# Patient Record
Sex: MALE | Race: WHITE | Employment: UNEMPLOYED | URBAN - METROPOLITAN AREA
[De-identification: names, ages, dates, MRNs, and addresses within clinical notes are randomized per-mention and may not be internally consistent; named-entity substitution may affect disease eponyms.]

---

## 2020-02-18 ENCOUNTER — OFFICE VISIT (OUTPATIENT)
Dept: FAMILY MEDICINE CLINIC | Age: 20
End: 2020-02-18
Payer: COMMERCIAL

## 2020-02-18 VITALS
TEMPERATURE: 97.9 F | OXYGEN SATURATION: 99 % | RESPIRATION RATE: 18 BRPM | WEIGHT: 129 LBS | HEART RATE: 61 BPM | HEIGHT: 69 IN | SYSTOLIC BLOOD PRESSURE: 108 MMHG | DIASTOLIC BLOOD PRESSURE: 64 MMHG | BODY MASS INDEX: 19.11 KG/M2

## 2020-02-18 LAB
ALBUMIN SERPL-MCNC: 3.9 G/DL (ref 3.5–4.6)
ALP BLD-CCNC: 93 U/L (ref 35–104)
ALT SERPL-CCNC: 69 U/L (ref 0–41)
ANION GAP SERPL CALCULATED.3IONS-SCNC: 11 MEQ/L (ref 9–15)
AST SERPL-CCNC: 44 U/L (ref 0–40)
BILIRUB SERPL-MCNC: <0.2 MG/DL (ref 0.2–0.7)
BUN BLDV-MCNC: 10 MG/DL (ref 6–20)
CALCIUM SERPL-MCNC: 8.4 MG/DL (ref 8.5–9.9)
CHLORIDE BLD-SCNC: 103 MEQ/L (ref 95–107)
CHOLESTEROL, FASTING: 108 MG/DL (ref 0–199)
CO2: 25 MEQ/L (ref 20–31)
CREAT SERPL-MCNC: 0.77 MG/DL (ref 0.7–1.2)
GFR AFRICAN AMERICAN: >60
GFR NON-AFRICAN AMERICAN: >60
GLOBULIN: 3.4 G/DL (ref 2.3–3.5)
GLUCOSE BLD-MCNC: 93 MG/DL (ref 70–99)
HCT VFR BLD CALC: 34.8 % (ref 42–52)
HDLC SERPL-MCNC: 24 MG/DL (ref 40–59)
HEMOGLOBIN: 10.8 G/DL (ref 14–18)
LDL CHOLESTEROL CALCULATED: 72 MG/DL (ref 0–129)
MCH RBC QN AUTO: 22.4 PG (ref 27–31.3)
MCHC RBC AUTO-ENTMCNC: 30.9 % (ref 33–37)
MCV RBC AUTO: 72.3 FL (ref 80–100)
PDW BLD-RTO: 16.6 % (ref 11.5–14.5)
PLATELET # BLD: 315 K/UL (ref 130–400)
POTASSIUM SERPL-SCNC: 4.2 MEQ/L (ref 3.4–4.9)
RBC # BLD: 4.81 M/UL (ref 4.7–6.1)
SODIUM BLD-SCNC: 139 MEQ/L (ref 135–144)
TOTAL PROTEIN: 7.3 G/DL (ref 6.3–8)
TRIGLYCERIDE, FASTING: 61 MG/DL (ref 0–150)
TSH REFLEX: 3.02 UIU/ML (ref 0.44–3.86)
WBC # BLD: 4.8 K/UL (ref 4.5–11)

## 2020-02-18 PROCEDURE — G8420 CALC BMI NORM PARAMETERS: HCPCS | Performed by: PHYSICIAN ASSISTANT

## 2020-02-18 PROCEDURE — G8482 FLU IMMUNIZE ORDER/ADMIN: HCPCS | Performed by: PHYSICIAN ASSISTANT

## 2020-02-18 PROCEDURE — 90688 IIV4 VACCINE SPLT 0.5 ML IM: CPT | Performed by: PHYSICIAN ASSISTANT

## 2020-02-18 PROCEDURE — 99203 OFFICE O/P NEW LOW 30 MIN: CPT | Performed by: PHYSICIAN ASSISTANT

## 2020-02-18 PROCEDURE — 1036F TOBACCO NON-USER: CPT | Performed by: PHYSICIAN ASSISTANT

## 2020-02-18 PROCEDURE — 90471 IMMUNIZATION ADMIN: CPT | Performed by: PHYSICIAN ASSISTANT

## 2020-02-18 PROCEDURE — G8427 DOCREV CUR MEDS BY ELIG CLIN: HCPCS | Performed by: PHYSICIAN ASSISTANT

## 2020-02-18 SDOH — HEALTH STABILITY: MENTAL HEALTH: HOW OFTEN DO YOU HAVE A DRINK CONTAINING ALCOHOL?: NEVER

## 2020-02-18 ASSESSMENT — PATIENT HEALTH QUESTIONNAIRE - PHQ9
SUM OF ALL RESPONSES TO PHQ9 QUESTIONS 1 & 2: 0
SUM OF ALL RESPONSES TO PHQ QUESTIONS 1-9: 0
SUM OF ALL RESPONSES TO PHQ QUESTIONS 1-9: 0
1. LITTLE INTEREST OR PLEASURE IN DOING THINGS: 0
2. FEELING DOWN, DEPRESSED OR HOPELESS: 0

## 2020-02-18 ASSESSMENT — ENCOUNTER SYMPTOMS
RESPIRATORY NEGATIVE: 1
GASTROINTESTINAL NEGATIVE: 1
ALLERGIC/IMMUNOLOGIC NEGATIVE: 1
EYES NEGATIVE: 1

## 2020-02-18 NOTE — PROGRESS NOTES
Patient can eat eggs  Is not currently sick  Has tolerated in the past   No hx of Guillain-Barré syndrome (GBS)

## 2020-03-04 ENCOUNTER — HOSPITAL ENCOUNTER (OUTPATIENT)
Dept: PHYSICAL THERAPY | Age: 20
Setting detail: THERAPIES SERIES
Discharge: HOME | End: 2020-03-04
Payer: COMMERCIAL

## 2020-03-04 PROCEDURE — 97161 PT EVAL LOW COMPLEX 20 MIN: CPT

## 2020-03-04 PROCEDURE — 97110 THERAPEUTIC EXERCISES: CPT

## 2020-03-04 ASSESSMENT — PAIN SCALES - GENERAL: PAINLEVEL_OUTOF10: 5

## 2020-03-04 ASSESSMENT — PAIN DESCRIPTION - PROGRESSION: CLINICAL_PROGRESSION: GRADUALLY WORSENING

## 2020-03-04 ASSESSMENT — PAIN DESCRIPTION - FREQUENCY: FREQUENCY: CONTINUOUS

## 2020-03-04 ASSESSMENT — PAIN DESCRIPTION - ONSET: ONSET: PROGRESSIVE

## 2020-03-04 ASSESSMENT — PAIN DESCRIPTION - ORIENTATION: ORIENTATION: RIGHT;LEFT;ANTERIOR;POSTERIOR

## 2020-03-04 ASSESSMENT — PAIN DESCRIPTION - LOCATION: LOCATION: KNEE

## 2020-03-04 ASSESSMENT — PAIN - FUNCTIONAL ASSESSMENT: PAIN_FUNCTIONAL_ASSESSMENT: PREVENTS OR INTERFERES WITH MANY ACTIVE NOT PASSIVE ACTIVITIES

## 2020-03-04 ASSESSMENT — PAIN DESCRIPTION - DESCRIPTORS: DESCRIPTORS: CONSTANT;THROBBING;STABBING

## 2020-03-04 ASSESSMENT — PAIN DESCRIPTION - PAIN TYPE: TYPE: CHRONIC PAIN

## 2020-03-04 NOTE — PROGRESS NOTES
which were (-). Pt reports R knee is worse than L knee with increased \"grinding and poppping. \" Denies N/T or radicular pain, however does have some pain throughout distal LE to B ankles. Has intermittent feelings of knees wanting to \"buckle\" however denies any falls.    Comments: RTD after PT     Objective:   Balance  Single Leg Stance R Le  Single Leg Stance L Le  Comments: min frontal plane mvmt with R knee pain and feeling like it was \"going to buckle\"    Ambulation 1  Surface: carpet  Device: No Device  Assistance: Independent  Quality of Gait: R toe out, increased time spent on toes, B medial whip slight L overpronation  Distance: within dept clinical distances     Strength RLE  Comment: 4/5 hip flex, abd, knee ext, hip ER 4-/5 Hip IR, ankle DF, knee flex, hip ext, add   Strength LLE  Comment: 4-/5 knee, Hip IR, ER, add 4/5 hip flex, ext, abd, ankle DF    PROM RLE (degrees)  RLE General PROM: 90/90 HS 55  AROM RLE (degrees)  RLE General AROM: knee in supine 0-130 with pain at end range; ankle DF with KE 4 with KF 16  PROM LLE (degrees)  LLE General PROM: 90/90 HS 48   AROM LLE (degrees)  LLE General AROM: knee in supine 2-0-128 with pain at end range; ankle DF with KE 10 with KF 12     Observation/Palpation  Posture: Fair  Observation: B pes planus, R toe out, B patella verónica      Joint Mobility  ROM RLE: normal patellar mobility with mild decreased lateral translation and increased crepitus noted  ROM LLE: patellar hypomobility sup/inf, med/lat    Additional Measures  Special Tests: (-) varus/valgus stress, ant/post drawer   Other: able to perform mid squat with increased frontal plane mvmt, quad dominant, and early heel rise, R toe out     Exercises:   Exercises  Exercise 1: B HS stretch 30\"x3   Exercise 2: B gastroc/soleus stretch 30\"x3   Exercise 3: 4 way SLR*  Exercise 4: clams*  Exercise 5: bridges*   Exercise 6: standing gastroc/soleus stretches*  Exercise 7: pball hip ext on wall*   Exercise 8: SLS*  Exercise 9: step ups*  Exercise 10: SLS with reach*   Exercise 20: HEP: HS stretch, gastroc/soleus stretches   Modalities:  Modalities  Cryotherapy (Minutes\Location): declined this date   *Indicates exercise,modality, or manual techniques to be initiated when appropriate    Assessment: Body structures, Functions, Activity limitations: Decreased functional mobility , Decreased ROM, Decreased ADL status, Decreased strength, Decreased balance, Increased pain, Decreased posture  Assessment: Pt presents with a chronic hx of B knee pain with decreased B HS, gastroc/soleus flexibility, decreased B LE strength, decreased stability with B SLS, decreased fucntional activity tolerance, L patellar hypomobility, and crepitus with R patellar mobility. These impairments currently limit his fucntional abilities to perform ADL's, stand/ambulate prolonged periods, run, jump, perform stairs, or squat without increased pain or limitations.    Prognosis: Good  Discharge Recommendations: Continue to assess pending progress  Activity Tolerance: Patient Tolerated treatment well  Activity Tolerance: pt with fair vita to eval and initiation of HEP      Decision Making: Low Complexity  History: low  Exam: high; LEFS 53/80  Clinical Presentation: low      Plan  Frequency/Duration:  Plan  Times per week: 2  Plan weeks: 4-6  Current Treatment Recommendations: Strengthening, ROM, Gait Training, Balance Training, Neuromuscular Re-education, Manual Therapy - Soft Tissue Mobilization, Manual Therapy - Joint Manipulation, Pain Management, Home Exercise Program, Equipment Evaluation, Education, & procurement, Patient/Caregiver Education & Training, Modalities, Positioning     Patient Education  New Education Provided: PT Education: Goals;PT Role;Plan of Care;Home Exercise Program  Patient Education: evaluative findings, anatomy of current condition    POST-PAIN     Pain Rating (0-10 pain scale):   5/10 R knee 2/10 L knee  Location and pain description same as pre-treatment unless indicated. Action: [] NA  [] Call Physician  [x] Perform HEP  [] Meds as prescribed    Evaluation and patient rights have been reviewed and patient agrees with plan of care. Yes  [x]  No  []   Explain:     [place] Fall Risk Assessment  Risk Factor Scale  Score   History of Falls [] Yes  [x] No 25  0    Secondary Diagnosis [] Yes  [x] No 15  0    Ambulatory Aid [] Furniture  [] Crutches/cane/walker  [x] None/bedrest/wheelchair/nurse 30  15  0    IV/Heparin Lock [] Yes  [x] No 20  0    Gait/Transferring [] Impaired  [] Weak  [x] Normal/bedrest/immobile 20  10  0    Mental Status [] Forgets limitations  [x] Oriented to own ability 15  0       Total: 0     Based on the Assessment score: check the appropriate box.   [x]  No intervention needed   Low =   Score of 0-24  []  Use standard prevention interventions Moderate =  Score of 24-44   [] Discuss fall prevention strategies   [] Indicate moderate falls risk on eval  []  Use high risk prevention interventions High = Score of 45 and higher   [] Discuss fall prevention strategies   [] Provide supervision during treatment time    Goals  Short term goals  Time Frame for Short term goals: 2-3 weeks   Short term goal 1: The pt will report decreased B knee pain </=3/10 at worst in order to perform functional ADL's  Long term goals  Time Frame for Long term goals : 4-6 weeks   Long term goal 1: The pt will have a increase in LEFS score >/=10 points in order to increase functional activity tolerance   Long term goal 2: The pt will be indep/compliant with HEP in order to self manage symptoms upon D/C  Long term goal 3: The pt will demo improved B LE flexibility with improved B ankle DF AROM with KE with>/=15* with KF >/=20* and B HS PROM with 90/90 </=30* in order to increase LE biomechanics and decrease knee pain  Long term goal 4: The pt will demo improved B LE strength >/= 4+/5 in order to ambulate prolonged periods with decreased pain/limitations   Long term goal 5:  The pt will demo improved B SLS >/=30 seconds without frontal plane instability to increase B Le stability and decrease episodes of \"buckling\"    Treatment Initiated : ther ex and hep     PT Individual Minutes  Time In: 3789  Time Out: 8514  Minutes: 45  Timed Code Treatment Minutes: 12 Minutes  Procedure Minutes: 33 -eval     Timed Activity Minutes Units   Ther Ex 12 1     Electronically signed by Zen Charles PT on 3/4/20 at 3:18 PM

## 2020-03-04 NOTE — PROGRESS NOTES
improved B LE strength >/= 4+/5 in order to ambulate prolonged periods with decreased pain/limitations  Strength RLE  Comment: 4/5 hip flex, abd, knee ext, hip ER 4-/5 Hip IR, ankle DF, knee flex, hip ext, add   Strength LLE  Comment: 4-/5 knee, Hip IR, ER, add 4/5 hip flex, ext, abd, ankle DF   [] yes  [x] no   Long term goal 5: The pt will demo improved B SLS >/=30 seconds without frontal plane instability to increase B Le stability and decrease episodes of \"buckling\" L SLS 30\"   R SLS 17\"  min frontal plane mvmt B with R knee pain and feeling like it was \"going to buckle\" [] yes  [x] no      Body structures, Functions, Activity limitations: Decreased functional mobility , Decreased ROM, Decreased ADL status, Decreased strength, Decreased balance, Increased pain, Decreased posture  Assessment: Pt presents with a chronic hx of B knee pain with decreased B HS, gastroc/soleus flexibility, decreased B LE strength, decreased stability with B SLS, decreased fucntional activity tolerance, L patellar hypomobility, and crepitus with R patellar mobility. These impairments currently limit his fucntional abilities to perform ADL's, stand/ambulate prolonged periods, run, jump, perform stairs, or squat without increased pain or limitations.    Prognosis: Good  Discharge Recommendations: Continue to assess pending progress    PT Education: Goals;PT Role;Plan of Care;Home Exercise Program  Patient Education: evaluative findings, anatomy of current condition    PLAN: [x] Evaluate and Treat  Frequency/Duration:  Plan  Times per week: 2  Plan weeks: 4-6  Current Treatment Recommendations: Strengthening, ROM, Gait Training, Balance Training, Neuromuscular Re-education, Manual Therapy - Soft Tissue Mobilization, Manual Therapy - Joint Manipulation, Pain Management, Home Exercise Program, Equipment Evaluation, Education, & procurement, Patient/Caregiver Education & Training, Modalities, Positioning                  Patient Status:[x] Continue/ Initiate plan of Care    [] Discharge PT. Recommend pt continue with HEP. [] Additional visits requested, Please re-certify for additional visits:        Signature: Electronically signed by Anibal Washburn PT on 3/4/20 at 3:20 PM    If you have any questions or concerns, please don't hesitate to call. Thank you for your referral.    I have reviewed this plan of care and certify a need for medically necessary rehabilitation services.     Physician Signature:__________________________________________________________  Date:  Please sign and return

## 2020-03-09 ENCOUNTER — HOSPITAL ENCOUNTER (OUTPATIENT)
Dept: PHYSICAL THERAPY | Age: 20
Setting detail: THERAPIES SERIES
Discharge: HOME | End: 2020-03-09
Payer: COMMERCIAL

## 2020-03-09 PROCEDURE — 97110 THERAPEUTIC EXERCISES: CPT

## 2020-03-09 ASSESSMENT — PAIN DESCRIPTION - LOCATION: LOCATION: KNEE

## 2020-03-09 ASSESSMENT — PAIN DESCRIPTION - DESCRIPTORS: DESCRIPTORS: CONSTANT;ACHING;STABBING

## 2020-03-09 ASSESSMENT — PAIN DESCRIPTION - PAIN TYPE: TYPE: CHRONIC PAIN

## 2020-03-09 ASSESSMENT — PAIN DESCRIPTION - ORIENTATION: ORIENTATION: RIGHT;LEFT;ANTERIOR;POSTERIOR

## 2020-03-09 ASSESSMENT — PAIN SCALES - GENERAL: PAINLEVEL_OUTOF10: 5

## 2020-03-09 ASSESSMENT — PAIN DESCRIPTION - FREQUENCY: FREQUENCY: CONTINUOUS

## 2020-03-09 NOTE — PROGRESS NOTES
[id number] [address]  Outpatient Physical Therapy    Treatment Note        Date: 3/9/2020  Patient: Franky Terry  : 2000  ACCT #: [de-identified]  Referring Practitioner: Lilliana Eubanks PA-C  Diagnosis: Chronic pain of B knees     Visit Information:  PT Visit Information  Onset Date: 20  [facility]  Total # of Visits to Date: 2  No Show: 0  Canceled Appointment: 0  Progress Note Counter: -    Subjective: Reports B knee's are still sore. Completing HEP  Comments: RTD after PT   HEP Compliance:  [x] Good [] Fair [] Poor [] Reports not doing due to:    Vital Signs  Patient Currently in Pain: Yes   Pain Screening  Patient Currently in Pain: Yes  Pain Assessment  Pain Assessment: 0-10  Pain Level: 5(L knee 3/10)  Pain Type: Chronic pain  Pain Location: Knee  Pain Orientation: Right;Left; Anterior;Posterior  Pain Descriptors: Constant; Aching;Stabbing  Pain Frequency: Continuous    OBJECTIVE:   Exercises  Exercise 1: B HS stretch 30\"x3   Exercise 2: B gastroc/soleus stretch 30\"x3   Exercise 3: 4 way SLR X 10 each  Exercise 4: clams X 10  Exercise 5: bridges X 10 5 second hold  Exercise 6: standing gastroc/soleus stretches X 3 30 seconds COMPA  Exercise 7: pball hip ext on wall*   Exercise 8: SLS x 3 10 seconds COMPA (increase time NV)  Exercise 9: step ups*  Exercise 10: SLS with reach*   Exercise 20: HEP: HS stretch, gastroc/soleus stretches; clams, standing gastroc/soleus stretch; bridges    Assessment: Body structures, Functions, Activity limitations: Decreased functional mobility , Decreased ROM, Decreased ADL status, Decreased strength, Decreased balance, Increased pain, Decreased posture  Assessment: Progressed TE for flexibility to COMPA LE and ankles for improved biomechanics and decrease pain. Pt reports increased B knee/quad discomfort with 4 way SLR's. Performs standing gastroc/soleus stretch with verbal cues for technique and stretch intensity.   Requires 1 UE support during R SLS for stability and safety. Increased R knee pain to 6/10 and L knee decreased to 2/10 after treatment. Treatment Diagnosis: B knee pain with decreased B HS, gastroc/soleus flexibility, decreased B LE strength, decreased stability with B SLS, decreased fucntional activity tolerance, L patellar hypomobility, and crepitus with R patellar mobility  Prognosis: Good  Patient Education: Pt educated on TE progression for flexibility and strength. States he understands and performs with verbal cues. Goals:  Short term goals  Time Frame for Short term goals: 2-3 weeks   Short term goal 1: The pt will report decreased B knee pain </=3/10 at worst in order to perform functional ADL's    Long term goals  Time Frame for Long term goals : 4-6 weeks   Long term goal 1: The pt will have a increase in LEFS score >/=10 points in order to increase functional activity tolerance   Long term goal 2: The pt will be indep/compliant with HEP in order to self manage symptoms upon D/C  Long term goal 3: The pt will demo improved B LE flexibility with improved B ankle DF AROM with KE with>/=15* with KF >/=20* and B HS PROM with 90/90 </=30* in order to increase LE biomechanics and decrease knee pain  Long term goal 4: The pt will demo improved B LE strength >/= 4+/5 in order to ambulate prolonged periods with decreased pain/limitations   Long term goal 5: The pt will demo improved B SLS >/=30 seconds without frontal plane instability to increase B Le stability and decrease episodes of \"buckling\"  Progress toward goals: TE/HEP progression for B LE strengthening and flexibility and decrease pain. POST-PAIN       Pain Rating (0-10 pain scale):   6/10 R knee; L knee 2/10  Location and pain description same as pre-treatment unless indicated.    Action: [] NA   [x] Perform HEP  [] Meds as prescribed  [] Modalities as prescribed   [] Call Physician     Frequency/Duration:  Plan  Times per week: 2  Plan weeks: 4-6  Current Treatment Recommendations: Strengthening, ROM, Gait Training, Balance Training, Neuromuscular Re-education, Manual Therapy - Soft Tissue Mobilization, Manual Therapy - Joint Manipulation, Pain Management, Home Exercise Program, Equipment Evaluation, Education, & procurement, Patient/Caregiver Education & Training, Modalities, Positioning     Pt to continue current HEP. See objective section for any therapeutic exercise changes, additions or modifications this date.     PT Individual Minutes  Time In: 0586  Time Out: 9811  Minutes: 39  Timed Code Treatment Minutes: 39 Minutes  Procedure Minutes: 0     Timed Activity Minutes Units   Ther Ex 39 3       Signature:  Electronically signed by Raman Mukherjee PTA on 3/9/20 at 3:47 PM EDT

## 2020-03-11 ENCOUNTER — HOSPITAL ENCOUNTER (OUTPATIENT)
Dept: PHYSICAL THERAPY | Age: 20
Setting detail: THERAPIES SERIES
Discharge: HOME | End: 2020-03-11
Payer: COMMERCIAL

## 2020-03-11 PROCEDURE — 97110 THERAPEUTIC EXERCISES: CPT

## 2020-03-11 ASSESSMENT — PAIN DESCRIPTION - LOCATION: LOCATION: KNEE

## 2020-03-11 ASSESSMENT — PAIN SCALES - GENERAL: PAINLEVEL_OUTOF10: 5

## 2020-03-11 ASSESSMENT — PAIN DESCRIPTION - ORIENTATION: ORIENTATION: RIGHT;LEFT

## 2020-03-11 ASSESSMENT — PAIN DESCRIPTION - DESCRIPTORS: DESCRIPTORS: ACHING;SHARP

## 2020-03-11 NOTE — PROGRESS NOTES
[address] [address]  Outpatient Physical Therapy    Treatment Note        Date: 3/11/2020  Patient: Shiela Huber  : 2000  ACCT #: [de-identified]  Referring Practitioner: Sami Turner PA-C  Diagnosis: Chronic pain of B knees     Visit Information:  PT Visit Information  Onset Date: 20  PT Insurance Information: [employer]  Total # of Visits to Date: 3  No Show: 0  Canceled Appointment: 0  Progress Note Counter: 3/8-    Subjective: Pt reports pain greater in Rt knee than Lt. States some discomfort with completing gastroc stretch at home. Comments: RTD after PT   HEP Compliance:  [x] Good [] Fair [] Poor [] Reports not doing due to:    Vital Signs  Patient Currently in Pain: Yes   Pain Screening  Patient Currently in Pain: Yes  Pain Assessment  Pain Assessment: 0-10  Pain Level: 5  Pain Location: Knee  Pain Orientation: Right;Left  Pain Descriptors: Aching; Sharp    OBJECTIVE:   Exercises  Exercise 1: B HS stretch standing at step 30 sec x 3   Exercise 3: 4 way SLR x10 each b/l  Exercise 4: clams x10 b/l  Exercise 5: bridges 5 sec x 10  Exercise 6: Gastroc str at steps 30 sec x 3 b/l  Exercise 7: pball hip ext on wall 5 sec x 10 b/l  Exercise 8: SLS 30 sec x 3 b/l  Exercise 9: step ups 4\" F x10 ea b/l  Exercise 10: SLS with reach x10 b/l  Exercise 20: HEP: 4 way SLR    *Indicates exercise, modality, or manual techniques to be initiated when appropriate    Assessment: Body structures, Functions, Activity limitations: Decreased functional mobility , Decreased ROM, Decreased ADL status, Decreased strength, Decreased balance, Increased pain, Decreased posture  Assessment: VC/TC to decreased compensations with SL hip abd this date with fair carryover. Pt reporting fatigue with current reps. Increased SLS time, as well as added step ups and SLS reach to progress without report of increased pain.    Treatment Diagnosis: B knee pain with decreased B HS, gastroc/soleus flexibility, decreased B LE strength, decreased stability with B SLS, decreased fucntional activity tolerance, L patellar hypomobility, and crepitus with R patellar mobility  Prognosis: Good     Goals:  Short term goals  Time Frame for Short term goals: 2-3 weeks   Short term goal 1: The pt will report decreased B knee pain </=3/10 at worst in order to perform functional ADL's    Long term goals  Time Frame for Long term goals : 4-6 weeks   Long term goal 1: The pt will have a increase in LEFS score >/=10 points in order to increase functional activity tolerance   Long term goal 2: The pt will be indep/compliant with HEP in order to self manage symptoms upon D/C  Long term goal 3: The pt will demo improved B LE flexibility with improved B ankle DF AROM with KE with>/=15* with KF >/=20* and B HS PROM with 90/90 </=30* in order to increase LE biomechanics and decrease knee pain  Long term goal 4: The pt will demo improved B LE strength >/= 4+/5 in order to ambulate prolonged periods with decreased pain/limitations   Long term goal 5: The pt will demo improved B SLS >/=30 seconds without frontal plane instability to increase B Le stability and decrease episodes of \"buckling\"  Progress toward goals: Progressing towards all    POST-PAIN       Pain Rating (0-10 pain scale):   5/10 Rt knee, 2/10 Lt knee  Location and pain description same as pre-treatment unless indicated. Action: [] NA   [x] Perform HEP  [] Meds as prescribed  [] Modalities as prescribed   [] Call Physician     Frequency/Duration:  Plan  Times per week: 2  Plan weeks: 4-6  Current Treatment Recommendations: Strengthening, ROM, Gait Training, Balance Training, Neuromuscular Re-education, Manual Therapy - Soft Tissue Mobilization, Manual Therapy - Joint Manipulation, Pain Management, Home Exercise Program, Equipment Evaluation, Education, & procurement, Patient/Caregiver Education & Training, Modalities, Positioning     Pt to continue current HEP.   See objective section for any therapeutic exercise changes, additions or modifications this date.          PT Individual Minutes  Time In: 1520  Time Out: 1388  Minutes: 38  Timed Code Treatment Minutes: 38 Minutes  Procedure Minutes: 0     Timed Activity Minutes Units   Ther Ex 38 3       Signature:  Electronically signed by Taylor Nina PTA on 3/11/20 at 3:26 PM EDT

## 2020-03-17 ENCOUNTER — HOSPITAL ENCOUNTER (OUTPATIENT)
Dept: PHYSICAL THERAPY | Age: 20
Setting detail: THERAPIES SERIES
Discharge: HOME | End: 2020-03-17
Payer: COMMERCIAL

## 2020-03-17 ENCOUNTER — OFFICE VISIT (OUTPATIENT)
Dept: FAMILY MEDICINE CLINIC | Age: 20
End: 2020-03-17
Payer: COMMERCIAL

## 2020-03-17 VITALS
SYSTOLIC BLOOD PRESSURE: 106 MMHG | DIASTOLIC BLOOD PRESSURE: 66 MMHG | HEIGHT: 69 IN | TEMPERATURE: 98.3 F | OXYGEN SATURATION: 97 % | BODY MASS INDEX: 19.4 KG/M2 | WEIGHT: 131 LBS | HEART RATE: 102 BPM

## 2020-03-17 LAB — S PYO AG THROAT QL: NORMAL

## 2020-03-17 PROCEDURE — G8427 DOCREV CUR MEDS BY ELIG CLIN: HCPCS | Performed by: PHYSICIAN ASSISTANT

## 2020-03-17 PROCEDURE — 99213 OFFICE O/P EST LOW 20 MIN: CPT | Performed by: PHYSICIAN ASSISTANT

## 2020-03-17 PROCEDURE — 87880 STREP A ASSAY W/OPTIC: CPT | Performed by: PHYSICIAN ASSISTANT

## 2020-03-17 PROCEDURE — G8420 CALC BMI NORM PARAMETERS: HCPCS | Performed by: PHYSICIAN ASSISTANT

## 2020-03-17 PROCEDURE — 1036F TOBACCO NON-USER: CPT | Performed by: PHYSICIAN ASSISTANT

## 2020-03-17 PROCEDURE — G8482 FLU IMMUNIZE ORDER/ADMIN: HCPCS | Performed by: PHYSICIAN ASSISTANT

## 2020-03-17 RX ORDER — AMOXICILLIN AND CLAVULANATE POTASSIUM 875; 125 MG/1; MG/1
1 TABLET, FILM COATED ORAL 2 TIMES DAILY
Qty: 20 TABLET | Refills: 0 | Status: SHIPPED | OUTPATIENT
Start: 2020-03-17 | End: 2020-03-27

## 2020-03-17 RX ORDER — FLUTICASONE PROPIONATE 50 MCG
1 SPRAY, SUSPENSION (ML) NASAL DAILY
Qty: 1 BOTTLE | Refills: 0 | Status: SHIPPED | OUTPATIENT
Start: 2020-03-17

## 2020-03-17 RX ORDER — BENZONATATE 200 MG/1
200 CAPSULE ORAL 3 TIMES DAILY PRN
Qty: 30 CAPSULE | Refills: 0 | Status: SHIPPED | OUTPATIENT
Start: 2020-03-17 | End: 2020-03-24

## 2020-03-17 SDOH — ECONOMIC STABILITY: FOOD INSECURITY: WITHIN THE PAST 12 MONTHS, YOU WORRIED THAT YOUR FOOD WOULD RUN OUT BEFORE YOU GOT MONEY TO BUY MORE.: NEVER TRUE

## 2020-03-17 SDOH — ECONOMIC STABILITY: FOOD INSECURITY: WITHIN THE PAST 12 MONTHS, THE FOOD YOU BOUGHT JUST DIDN'T LAST AND YOU DIDN'T HAVE MONEY TO GET MORE.: NEVER TRUE

## 2020-03-17 ASSESSMENT — ENCOUNTER SYMPTOMS
EYES NEGATIVE: 1
SINUS PRESSURE: 1
DIARRHEA: 0
NAUSEA: 0
ALLERGIC/IMMUNOLOGIC NEGATIVE: 1
COUGH: 1
BACK PAIN: 0
CONSTIPATION: 0
SINUS PAIN: 1
SORE THROAT: 1
VOMITING: 0

## 2020-03-17 NOTE — PROGRESS NOTES
[address]       Physical Therapy  Cancellation/No-show Note  Patient Name:  Bryson Potter  :  2000   Date:  3/17/2020  Referring Practitioner: Francesco Becker PA-C  Diagnosis: Chronic pain of B knees     Visit Information:  PT Visit Information  Onset Date: 20  [facility]  Total # of Visits to Date: 3  No Show: 0  Canceled Appointment: 1  Progress Note Counter: 3/8-12 Called to cx, no reason given.     For today's appointment patient:  [x]  Cancelled  []  Rescheduled appointment  []  No-show   []  Called pt to remind of next appointment     Reason given by patient:  []  Patient ill  []  Conflicting appointment  []  No transportation    []  Conflict with work  [x]  No reason given  []  Other:       Comments:       Signature: Electronically signed by Stacy Kirkpatrick PTA on 3/17/20 at 1:20 PM EDT

## 2020-03-19 ENCOUNTER — HOSPITAL ENCOUNTER (OUTPATIENT)
Dept: PHYSICAL THERAPY | Age: 20
Setting detail: THERAPIES SERIES
Discharge: HOME | End: 2020-03-19
Payer: COMMERCIAL

## 2020-03-19 PROCEDURE — 97110 THERAPEUTIC EXERCISES: CPT

## 2020-03-19 ASSESSMENT — PAIN DESCRIPTION - ORIENTATION: ORIENTATION: RIGHT;LEFT

## 2020-03-19 ASSESSMENT — PAIN DESCRIPTION - DESCRIPTORS: DESCRIPTORS: ACHING

## 2020-03-19 ASSESSMENT — PAIN DESCRIPTION - LOCATION: LOCATION: KNEE

## 2020-03-19 ASSESSMENT — PAIN DESCRIPTION - PAIN TYPE: TYPE: CHRONIC PAIN

## 2020-03-19 NOTE — PROGRESS NOTES
[address] [address]  Outpatient Physical Therapy    Treatment Note        Date: 3/19/2020  Patient: Carolann Brito  : 2000  ACCT #: [de-identified]  Referring Practitioner: Kashif Hunt PA-C  Diagnosis: Chronic pain of B knees     Visit Information:  PT Visit Information  Onset Date: 20  PT Insurance Information: [facility]  Total # of Visits to Date: 4  No Show: 0  Canceled Appointment: 1  Progress Note Counter:      Subjective: Pt reports saw MD who diagnosed him with sinusitis/seasonal allergies. Pt reports being compliant with HEP. HEP Compliance:  [x] Good [] Fair [] Poor [] Reports not doing due to:    Vital Signs  Patient Currently in Pain: Yes   Pain Screening  Patient Currently in Pain: Yes  Pain Assessment  Pain Assessment: 0-10  Pain Level: (R=3/10 L=2/10)  Pain Type: Chronic pain  Pain Location: Knee  Pain Orientation: Right;Left  Pain Descriptors: Aching    OBJECTIVE:   Exercises  Exercise 1: B HS stretch standing at step 30 sec x 3   Exercise 3: SLR x12, S/L hip series x10, hip ext, add x12- VC's required to decrease compensations     Exercise 4: clams x15 b/l- VC's for pelvic alignment   Exercise 5: bridges 5 sec x 12  Exercise 6: Gastroc str at steps 30 sec x 3 b/l  Exercise 7: pball hip ext on wall 5 sec x 10 b/l  Exercise 8: SLS blue foam 20-30\"x3 b/l   Exercise 9: step ups 6\" F/L x15ea b/l  Exercise 10: SLS with reach x10 b/l  Exercise 11: glut dom squats x10- signifciant VC's required for technique/form   Exercise 20: HEP: cont current     Strength: [x] NT  [] MMT completed:    ROM: [x] NT  [] ROM measurements:  *Indicates exercise, modality, or manual techniques to be initiated when appropriate    Assessment:    Body structures, Functions, Activity limitations: Decreased functional mobility , Decreased ROM, Decreased ADL status, Decreased strength, Decreased balance, Increased pain, Decreased posture  Assessment: Pt conts to require VC/TC's for pelvic

## 2020-03-23 ENCOUNTER — HOSPITAL ENCOUNTER (OUTPATIENT)
Dept: PHYSICAL THERAPY | Age: 20
Setting detail: THERAPIES SERIES
Discharge: HOME | End: 2020-03-23
Payer: COMMERCIAL

## 2020-03-23 NOTE — PROGRESS NOTES
Due to the COVID-19 pandemic and the subsequent [facility] [address], this patient's program is being diverted to a home exercise based program starting March 23, 2020. A formal home program has been or will be established for the patient while services are temporarily suspended. Full frequency may be re-instated upon lifting of the directive. Electronically signed by Melly Montano PT on 3/23/20 at 2:16 PM EDT    *Called and left message of above information and advised pt to call back if wanting to be D/C'd or needing exercise progressions during this time.

## 2020-03-25 ENCOUNTER — HOSPITAL ENCOUNTER (OUTPATIENT)
Dept: PHYSICAL THERAPY | Age: 20
Setting detail: THERAPIES SERIES
Discharge: HOME | End: 2020-03-25
Payer: COMMERCIAL

## 2020-03-30 ENCOUNTER — APPOINTMENT (OUTPATIENT)
Dept: PHYSICAL THERAPY | Age: 20
End: 2020-03-30
Payer: COMMERCIAL